# Patient Record
Sex: FEMALE | Race: WHITE | NOT HISPANIC OR LATINO
[De-identification: names, ages, dates, MRNs, and addresses within clinical notes are randomized per-mention and may not be internally consistent; named-entity substitution may affect disease eponyms.]

---

## 2018-04-10 ENCOUNTER — TRANSCRIPTION ENCOUNTER (OUTPATIENT)
Age: 58
End: 2018-04-10

## 2023-03-31 PROBLEM — Z00.00 ENCOUNTER FOR PREVENTIVE HEALTH EXAMINATION: Status: ACTIVE | Noted: 2023-03-31

## 2023-04-06 ENCOUNTER — APPOINTMENT (OUTPATIENT)
Dept: ORTHOPEDIC SURGERY | Facility: CLINIC | Age: 63
End: 2023-04-06
Payer: COMMERCIAL

## 2023-04-06 VITALS — HEIGHT: 65 IN | WEIGHT: 225 LBS | BODY MASS INDEX: 37.49 KG/M2

## 2023-04-06 DIAGNOSIS — Z87.828 PERSONAL HISTORY OF OTHER (HEALED) PHYSICAL INJURY AND TRAUMA: ICD-10-CM

## 2023-04-06 DIAGNOSIS — Z78.9 OTHER SPECIFIED HEALTH STATUS: ICD-10-CM

## 2023-04-06 DIAGNOSIS — Z72.3 LACK OF PHYSICAL EXERCISE: ICD-10-CM

## 2023-04-06 DIAGNOSIS — Z86.39 PERSONAL HISTORY OF OTHER ENDOCRINE, NUTRITIONAL AND METABOLIC DISEASE: ICD-10-CM

## 2023-04-06 DIAGNOSIS — Z82.61 FAMILY HISTORY OF ARTHRITIS: ICD-10-CM

## 2023-04-06 PROCEDURE — 73564 X-RAY EXAM KNEE 4 OR MORE: CPT | Mod: 50

## 2023-04-06 PROCEDURE — 99203 OFFICE O/P NEW LOW 30 MIN: CPT

## 2023-04-06 RX ORDER — MELOXICAM 15 MG/1
TABLET ORAL
Refills: 0 | Status: ACTIVE | COMMUNITY

## 2023-04-06 RX ORDER — ATORVASTATIN CALCIUM 10 MG/1
10 TABLET, FILM COATED ORAL
Refills: 0 | Status: ACTIVE | COMMUNITY

## 2023-04-06 NOTE — PROCEDURE
[Injection] : Injection [Right] : of the right [Knee Joint] : knee joint [Osteoarthritis] : Osteoarthritis [Patient] : patient [Risk] : risk [Benefits] : benefits [Alternatives] : alternatives [Bleeding] : bleeding [Infection] : infection [Allergic Reaction] : allergic reaction [Verbal Consent Obtained] : verbal consent was obtained prior to the procedure [Alcohol] : Alcohol [Ethyl Chloride Spray] : ethyl chloride spray was used as a topical anesthetic [Lateral] : lateral [22] : a 22-gauge [1% Lidocaine___(mL)] : [unfilled] mL of 1% Lidocaine [Triamcinolone 40mg/mL___(mL)] : [unfilled] ~UmL of 40mg/mL triamcinolone [Bandage Applied] : a bandage [Tolerated Well] : The patient tolerated the procedure well [None] : none [No Strenuous Activity___day(s)] : avoid strenuous activity for [unfilled] day(s) [PRN] : as needed [FreeTextEntry1] : ChloraPrep

## 2023-04-06 NOTE — PHYSICAL EXAM
[LE] : Sensory: Intact in bilateral lower extremities [Normal RLE] : Right Lower Extremity: No scars, rashes, lesions, ulcers, skin intact [Normal LLE] : Left Lower Extremity: No scars, rashes, lesions, ulcers, skin intact [Normal Touch] : sensation intact for touch [Normal] : Oriented to person, place, and time, insight and judgement were intact and the affect was normal [Slightly Antalgic] : slightly antalgic [Obese] : obese [de-identified] : RIGHT knee with left for comparison\par Mild antalgic\par There may be a small effusion.  No erythema or ecchymoses.  Skin is intact\par  Intact extensor mechanism.\par Knee range of motion is 5 to 120 degrees flexion with pain right knee. 0- 125 degrees left knee\par Pain with valgus stress medially.  Normal varus stress.  Negative anterior and posterior drawer.\par  Mild pain with Jorge Luis.\par Motor and sensation are intact distally\par  [de-identified] : \par \par X-rays taken today of bilateral knees weightbearing 4 views showed

## 2023-04-06 NOTE — HISTORY OF PRESENT ILLNESS
[de-identified] : Ms. Valdez is a 61 y/o woman who comes in for evaluation for RIGHT knee pain that started over a year ago when she was moving and developed acute pain in her knee.  She thought she may have injured it or twisted it when she was lifting and moving all of her boxes.  She saw her PCP who did x-rays which showed arthritis.  She felt like it was more of an injury.  She has not done any treatment except medication including meloxicam and sometimes she takes Advil as well.  This morning she took 2 Advil.\par Pain is aggravated by walking a lot and going downstairs.  She gets stiffness and pain sitting and is hard to start up when she gets up because of stiffness.  No prior injuries or problems before a year ago.  It throws off her gait and often she is limping.  Sleeps with a pillow between her knees sometimes and uses an over-the-counter sports spray which feels good.  Sometimes the knee feels like it is burning.  Pain can be a 5-7 out of 10.\par She has never had any injections or done physical therapy for her knee

## 2023-04-06 NOTE — ASSESSMENT
[FreeTextEntry1] : 62-year-old with right knee pain over the last 1-1/2 years with severe osteoarthritis in her knee greatest in the medial compartment consistent with the site of pain.  I talked her about the arthritis and treatment.  Her meniscus likely is extruded from the joint and degenerative.  Her knee is otherwise stable and I do not suspect any tears of the ligaments.  I would recommend for starting with physical therapy and weight loss to get pressure off the knee and to support her knee.  She can use heat and ice.  She can take an anti-inflammatory, either ibuprofen/IL or meloxicam but not both.  She could alternate with Tylenol.\par Since she has been taking anti-inflammatories for the past year we did a steroid injection today which hopefully will help.  If she does not get relief with the above treatment then we may consider hyaluronic acid injection.\par Knee arthritis often will worsen over time.  If it does not get better then ultimately knee replacement may be considered at some point in the future osteoarthritis gets worse.  Weight loss may really help get pressure off the knee and could help with her pain and the leg the need for surgery.

## 2023-08-01 ENCOUNTER — APPOINTMENT (OUTPATIENT)
Dept: ORTHOPEDIC SURGERY | Facility: CLINIC | Age: 63
End: 2023-08-01
Payer: COMMERCIAL

## 2023-08-01 PROCEDURE — 99212 OFFICE O/P EST SF 10 MIN: CPT | Mod: 95

## 2023-08-01 RX ORDER — HYALURONATE SODIUM, STABILIZED 88 MG/4 ML
88 SYRINGE (ML) INTRAARTICULAR
Qty: 1 | Refills: 0 | Status: ACTIVE | OUTPATIENT
Start: 2023-08-01

## 2023-08-01 NOTE — PHYSICAL EXAM
[Slightly Antalgic] : slightly antalgic [LE] : Sensory: Intact in bilateral lower extremities [Normal RLE] : Right Lower Extremity: No scars, rashes, lesions, ulcers, skin intact [Normal LLE] : Left Lower Extremity: No scars, rashes, lesions, ulcers, skin intact [Normal Touch] : sensation intact for touch [Obese] : obese [Normal] : Oriented to person, place, and time, insight and judgement were intact and the affect was normal [de-identified] : RIGHT knee with left for comparison Mild antalgic small effusion.  No erythema or ecchymoses.  Skin is intact  Intact extensor mechanism. Knee range of motion is 5 to 120 degrees flexion with minimal pain right knee. 0- 125 degrees left knee. Motor and sensation are intact distally  [de-identified] :  X-rays taken 4/6/23 of bilateral knees weightbearing 4 views showed severe medial compartment osteoarthritis in the right knee and more mild narrowing and degenerative changes in the left knee medial compartment.  Mild narrowing bilateral patellofemoral joints with small osteophytes. KL 4 changes medial compartment right knee and KL 2 in the left knee

## 2023-08-01 NOTE — ASSESSMENT
[FreeTextEntry1] : 63-year-old with right knee pain over the last 2 years with severe osteoarthritis in her knee greatest in the medial compartment consistent with the site of pain.  She has retained good partial relief of pain after the steroid injection, physical therapy and taking medication.  She has tried ibuprofen, naproxen and Tylenol which she now takes less after having the steroid injection and doing the therapy.  Given the ongoing pain we will order the hyaluronic acid injections to see if this gives her greater relief.  I discussed the injections and pros and cons.  She understands.  She will come in when we get it delivered and approved.

## 2023-08-01 NOTE — HISTORY OF PRESENT ILLNESS
[de-identified] : Ms. Valdez is a 62 y/o woman presents for f/u evaluation for chronic RIGHT knee pain that started over a year ago. Steroid injection was done in April which helped a lot.  She has been going to physical therapy which is helping as well.  She has had improvement in terms of walking with less pain and can get in and out of a car better and sleep with less pain.  She only needs to take the Advil a few times a week.  She ices regularly which helps.  It still little swollen.  She is encouraged but she also would like to try to get more relief.  She was hoping to try the hyaluronic acid injections.  She would like to try to put off knee replacement which she understands may ultimately be needed.

## 2024-04-23 ENCOUNTER — NON-APPOINTMENT (OUTPATIENT)
Age: 64
End: 2024-04-23

## 2024-05-03 ENCOUNTER — APPOINTMENT (OUTPATIENT)
Dept: ORTHOPEDIC SURGERY | Facility: CLINIC | Age: 64
End: 2024-05-03
Payer: COMMERCIAL

## 2024-05-03 VITALS
HEIGHT: 65 IN | HEART RATE: 74 BPM | OXYGEN SATURATION: 99 % | SYSTOLIC BLOOD PRESSURE: 150 MMHG | WEIGHT: 240 LBS | BODY MASS INDEX: 39.99 KG/M2 | DIASTOLIC BLOOD PRESSURE: 81 MMHG

## 2024-05-03 PROCEDURE — 73564 X-RAY EXAM KNEE 4 OR MORE: CPT | Mod: RT

## 2024-05-03 PROCEDURE — 99214 OFFICE O/P EST MOD 30 MIN: CPT

## 2024-05-03 PROCEDURE — 77073 BONE LENGTH STUDIES: CPT

## 2024-05-03 NOTE — CONSULT LETTER
[Dear  ___] : Dear  [unfilled], [Consult Letter:] : I had the pleasure of evaluating your patient, [unfilled]. [Please see my note below.] : Please see my note below. [Consult Closing:] : Thank you very much for allowing me to participate in the care of this patient.  If you have any questions, please do not hesitate to contact me. [Sincerely,] : Sincerely, [FreeTextEntry3] : Krishna Muro Jr. MD

## 2024-05-03 NOTE — PHYSICAL EXAM
[de-identified] : Right  Knee/Lower extremity Exam:   Skin: Normal. No erythema, no ecchymosis, no abrasions, no scratches, no tattoos.  Lower legs both have moderate edema from ankles to knee.                  Old Incisions: None.  Knee Joint Swelling: Positive effusion. Mild to Moderate  Popliteal Swelling: No palpable popliteal cysts	  Pre-Patella Bursa Swelling: None.   Alignment: 		         Varus, Mild  Passively correctable to near neutral.    Knee Joint Line Tenderness:  Tender at medial joint line.  Minimally tender at the mid lateral joint line.    Not tender in the patellofemoral compartment.   Soft Tissue Tenderness: None.   Medial Collateral Ligament Laxity:  Good endpoint.                                                        Medial opening to valgus stress.   Mild to Moderate.      Lateral Collateral Ligament Laxity:           Normal laxity. Good endpoint.   ROM Extension: 0 degrees.    ROM Flexion: 95 degrees.    ACL Testing: 			 Stable ACL. Good Endpoint.                                                                 Lachman Test: Negative  Anterior Drawer Test: Negative   PCL Testing: 			 Stable PCL.  Good Endpoint.                                                                Posterior Drawer Test: Negative   Patella Compression Test: Negative  Patellofemoral Crepitus: None.    Patellofemoral Apprehension Testing: Negative.  Patellofemoral Laxity: Normal.  Motor Strength: 			 Quadriceps strength is 5 out of 5                                                                 Hamstring strength is 5 out of 5                                                                Ankle dorsiflexion strength is 5 out of 5                                                               Ankle plantarflexion strength is 5 out of 5   Sensation: Light tough sensation in the lower extremity is grossly normal.                                       Pulses: 				 Pulses are palpable at the ankle at the Dorsalis Pedis Artery.                                                                Pulses are palpable at the Posterior Tibialis Artery.                                                                 Hip Motion: The patient has full and painless hip range of motion.                                                          Lumbar Spine Symptoms: Negative straight leg raise.                                                                Gait: Limping Gait.  Abnormal gait.    Assistive Devices: 	None   [de-identified] : X-ray of the Right Knee:  AP Standing View: Medial joint space loss. Severe.  Lateral joint space preserved.  PA Flexion View: Medial joint space loss. Severe. Bone on Bone. Lateral joint space preserved.  Lateral View:  Patellofemoral joint space preserved.  [place] View:  Patellofemoral joint space is preserved. Patellofemoral joint central tracking.  Bilateral Hip to Ankle Standing View: Alignment: 6 degrees varus on the right and 3 degrees of varus on the left. Medial joint space loss. Severe.  Hips: No significant changes

## 2024-05-03 NOTE — HISTORY OF PRESENT ILLNESS
[de-identified] : Sveta is a 63 year old female who presents today with right knee pain. She states the pain started 3 years ago after moving her home. She describes medial, back and front of knee pain. She has tried rest, ice, heat, Advil, PT and a single cortisone injection in the past to help with the pain about a year ago. She was referred by Dr. Montejo who gave her a CSI April of 2023.  [ Right Knee  ]  Onset of Knee Symptoms: 3 years  Knee Symptoms: Pain with Walking, Swelling front knee, behind knee, Limping, Decrease Walk Distance and Painful Clicking  Location of Pain: Inner Side of Knee, Back of Knee and Front of Knee  Duration of Pain: Daily  Intensity of Pain: Pain Level: 6-8 moderate  Character of Pain: Pain is Getting Worse  Painful Activities: Walking, Walking after Sitting, Rising From a Seat Stairs Going: up and down Car Getting: in and out  Knee History: History of Osteoarthritis  Non-Surgical Knee Treatments: Rest, Ice, Heat, NSAIDs, Physical Therapy and Cortisone Joint Injection  Surgical Knee History: none

## 2024-05-03 NOTE — DISCUSSION/SUMMARY
[de-identified] : Impression: Right knee osteoarthritis severe in the medial compartment. Medial compartment is severe with bone on bone on the flexion views. Joint space is preserved in the lateral and Patellofemoral compartments. Pain is mostly medial and posterior. She has some mild tenderness at the mid lateral joint line but no popping or crepitus at the lateral joint line. Pain has been going on for 3 years. She has tried PT, Cortisone, Advil, ice and has been diligently loosing weight.  Plan: Sveta would be a good candidate for a medial partial knee replacement as long as the lateral compartment has no meniscal tears or cartilage defects. We will get her an MRI to evaluate the lateral compartment for patholgy. IF normal on the lateral side, we will discuss partial knee replacement again. IF the lateral compartment has significant pathology, total knee would be more appropriate. She will return after the MRI for discussion.  cc: Francesca Simon MD

## 2024-05-13 ENCOUNTER — OUTPATIENT (OUTPATIENT)
Dept: OUTPATIENT SERVICES | Facility: HOSPITAL | Age: 64
LOS: 1 days | End: 2024-05-13

## 2024-05-13 ENCOUNTER — APPOINTMENT (OUTPATIENT)
Dept: MRI IMAGING | Facility: CLINIC | Age: 64
End: 2024-05-13
Payer: COMMERCIAL

## 2024-05-13 ENCOUNTER — APPOINTMENT (OUTPATIENT)
Dept: ORTHOPEDIC SURGERY | Facility: CLINIC | Age: 64
End: 2024-05-13
Payer: COMMERCIAL

## 2024-05-13 VITALS
DIASTOLIC BLOOD PRESSURE: 84 MMHG | OXYGEN SATURATION: 97 % | SYSTOLIC BLOOD PRESSURE: 129 MMHG | HEART RATE: 73 BPM | RESPIRATION RATE: 18 BRPM

## 2024-05-13 DIAGNOSIS — M17.11 UNILATERAL PRIMARY OSTEOARTHRITIS, RIGHT KNEE: ICD-10-CM

## 2024-05-13 PROCEDURE — 99213 OFFICE O/P EST LOW 20 MIN: CPT

## 2024-05-13 PROCEDURE — 73721 MRI JNT OF LWR EXTRE W/O DYE: CPT | Mod: 26,RT

## 2024-05-14 PROBLEM — M17.11 ARTHRITIS OF KNEE, RIGHT: Status: ACTIVE | Noted: 2023-04-06

## 2024-05-14 NOTE — HISTORY OF PRESENT ILLNESS
[de-identified] : Sveta is a 63 year old female who presents today with right knee pain and is here to discuss her MRI. We reviewed the MRI today in the exam room and talked about her knee in detail.  Her medial meniscus is severly torn and the medial compartment has severe osteoarthrtis. However, I was unable to appreciate the extent of the cartilage irregularity on the small exam room monitor and after further review on a high resolution monitor after her visit, it was apparent that the cartilage on the lateral compartment had areas of more moderate thinning and the was one or two images showing a midbody lateral meniscus tear. The patellofemoral compartment also had midl to moderate cartilage thinning in areas. So although the initial review made me think she was a good candidate for a medial partial knee, after further review, I feel she would be better served with a total knee which would address the lateral and patellofemoral findings seen on the MRI despite her not having lateral or patellofemoral symptoms today.

## 2024-05-14 NOTE — DISCUSSION/SUMMARY
[de-identified] : Sveta is a 63-year-old female who presents today with right knee pain and is here to discuss her MRI. We reviewed the MRI today in the exam room and talked about her knee in detail.  Her medial meniscus is severely torn, and the medial compartment has severe osteoarthritis. However, I was unable to appreciate the extent of the cartilage irregularity on the small exam room monitor and after further review on a high-resolution monitor after her visit, it was apparent that the cartilage on the lateral compartment had areas of more moderate thinning and the was one or two images showing a midbody lateral meniscus tear. The patellofemoral compartment also had mild to moderate cartilage thinning in areas. So, although the initial review made me think she was a good candidate for a medial partial knee, after further review, I feel she would be better served with a total knee which would address the lateral and patellofemoral findings seen on the MRI despite her not having lateral or patellofemoral symptoms today.

## 2024-05-17 ENCOUNTER — NON-APPOINTMENT (OUTPATIENT)
Age: 64
End: 2024-05-17

## 2024-06-03 ENCOUNTER — TRANSCRIPTION ENCOUNTER (OUTPATIENT)
Age: 64
End: 2024-06-03

## 2024-06-03 ENCOUNTER — NON-APPOINTMENT (OUTPATIENT)
Age: 64
End: 2024-06-03

## 2024-06-04 DIAGNOSIS — Z47.1 AFTERCARE FOLLOWING JOINT REPLACEMENT SURGERY: ICD-10-CM

## 2024-06-04 DIAGNOSIS — Z01.812 ENCOUNTER FOR PREPROCEDURAL LABORATORY EXAMINATION: ICD-10-CM

## 2024-06-04 DIAGNOSIS — Z96.651 AFTERCARE FOLLOWING JOINT REPLACEMENT SURGERY: ICD-10-CM

## 2024-06-04 DIAGNOSIS — Z96.651 PRESENCE OF RIGHT ARTIFICIAL KNEE JOINT: ICD-10-CM

## 2024-06-08 ENCOUNTER — NON-APPOINTMENT (OUTPATIENT)
Age: 64
End: 2024-06-08

## 2024-06-12 ENCOUNTER — OUTPATIENT (OUTPATIENT)
Dept: OUTPATIENT SERVICES | Facility: HOSPITAL | Age: 64
LOS: 1 days | End: 2024-06-12

## 2024-06-12 ENCOUNTER — APPOINTMENT (OUTPATIENT)
Dept: CT IMAGING | Facility: CLINIC | Age: 64
End: 2024-06-12
Payer: COMMERCIAL

## 2024-06-12 ENCOUNTER — APPOINTMENT (OUTPATIENT)
Dept: ORTHOPEDIC SURGERY | Facility: CLINIC | Age: 64
End: 2024-06-12
Payer: COMMERCIAL

## 2024-06-12 VITALS
OXYGEN SATURATION: 97 % | SYSTOLIC BLOOD PRESSURE: 134 MMHG | BODY MASS INDEX: 39.99 KG/M2 | DIASTOLIC BLOOD PRESSURE: 83 MMHG | HEIGHT: 65 IN | WEIGHT: 240 LBS | HEART RATE: 74 BPM

## 2024-06-12 DIAGNOSIS — G89.29 PAIN IN RIGHT KNEE: ICD-10-CM

## 2024-06-12 DIAGNOSIS — M25.561 PAIN IN RIGHT KNEE: ICD-10-CM

## 2024-06-12 DIAGNOSIS — S83.241D OTHER TEAR OF MEDIAL MENISCUS, CURRENT INJURY, RIGHT KNEE, SUBSEQUENT ENCOUNTER: ICD-10-CM

## 2024-06-12 DIAGNOSIS — S83.281D OTHER TEAR OF LATERAL MENISCUS, CURRENT INJURY, RIGHT KNEE, SUBSEQUENT ENCOUNTER: ICD-10-CM

## 2024-06-12 DIAGNOSIS — M17.11 UNILATERAL PRIMARY OSTEOARTHRITIS, RIGHT KNEE: ICD-10-CM

## 2024-06-12 PROCEDURE — 99215 OFFICE O/P EST HI 40 MIN: CPT

## 2024-06-12 PROCEDURE — 73700 CT LOWER EXTREMITY W/O DYE: CPT | Mod: 26,RT

## 2024-06-12 NOTE — DISCUSSION/SUMMARY
[de-identified] : Assessment:   -Knee Pain, Right Side -Osteoarthritis of the Right Knee. Lateral and medial meniscus tears.   -X-rays show advance osteoarthritis with moderate to severe joint space narrowing. -Imaging reviewed with patient today -Pain with weightbearing activity -Failure of conservative management.   Plan: -Procedure: Total Knee Replacement -Procedure date:6.18.2024 -Procedure Location: On license of [facility]. -Cleared for surgery, clearance and labs reviewed. -CT for preoperative and intraoperative Quang planning completed  -NPO midnight before surgery. - Although she is not of ideal weight and has edema in the legs, she is otherwise very motivated to do well and only is taking Lipitor for her medical issues. Once her knee is better, she is committed to getting back into shape again and I will cheer her on.   Discussion Total Knee Replacement:   - I had a discussion with the patient regarding the findings of their history, exam and imaging. We discussed the option of Total Knee Replacement using the precision Quang Robotic-Arm System and implants from [email or web address]. We discussed the indications, surgical process, my techniques of surgery, the probable post-operative course and instructions, and the risks and benefits of the procedure.   - Although there are no guarantees to success, I feel that the surgery would be beneficial and there is an excellent chance for a positive outcome.   - The implants used are the Triathlon knee system from [email or web address]. The materials are Titanium tibial implants, CoCr Alloy femoral implants, Polyethylene tibial bearings.   - The risks of this procedure include but are not limited to the risks of anesthesia, heart attack, stroke, respiratory complications, wound healing problems, skin blisters, delayed wound healing, infection, bleeding, nerve damage, vessel damage, skin sensory changes next to the incisions, loss of motion, scar tissue formation requiring further treatment, blood clots, heterotopic bone formation, implant wear, implant loosening after surgery, allergic responses to the implant materials, continued pain despite proper implant placement, soft tissue pain, possible recurrent knee swelling, and the possibility for further surgery in the future.   - The patient had their questions answered to their satisfaction. - The patient understood our discussion that was aided by the use of knee models and implants, and a review of their imaging on the computer screen. - The patient expressed understanding and agreement with the plan.   - I look forward to the opportunity to help this patient with their painful knee condition.

## 2024-06-12 NOTE — PHYSICAL EXAM
[de-identified] : Right  Knee/Lower extremity Exam:   Skin:  No erythema, no ecchymosis, no abrasions, no scratches, no tattoos.  Lower legs both have moderate edema from ankles to knee. lower legs both have a purple hue to them. Both ankles are swollen.                  Old Incisions: None.  Knee Joint Swelling: Positive effusion. Mild to Moderate  Popliteal Swelling: No palpable popliteal cysts	  Pre-Patella Bursa Swelling: None.   Alignment: 		         Varus, Mild  Passively correctable to near neutral.    Knee Joint Line Tenderness:  Tender at medial joint line.  Minimally tender at the mid lateral joint line.    Not tender in the patellofemoral compartment.   Soft Tissue Tenderness: None.   Medial Collateral Ligament Laxity:  Good endpoint.                                                        Medial opening to valgus stress.   Mild to Moderate.      Lateral Collateral Ligament Laxity:           Normal laxity. Good endpoint.   ROM Extension: 0 degrees.    ROM Flexion: 95 degrees.  Maximum is 105 at PT.  ACL Testing: 			 Stable ACL. Good Endpoint.                                                                 Lachman Test: Negative  Anterior Drawer Test: Negative   PCL Testing: 			 Stable PCL.  Good Endpoint.                                                                Posterior Drawer Test: Negative   Patella Compression Test: Negative  Patellofemoral Crepitus: None.    Patellofemoral Apprehension Testing: Negative.  Patellofemoral Laxity: Normal.  Motor Strength: 			 Quadriceps strength is 5 out of 5                                                                 Hamstring strength is 5 out of 5                                                                Ankle dorsiflexion strength is 5 out of 5                                                               Ankle plantarflexion strength is 5 out of 5   Sensation: Light tough sensation in the lower extremity is grossly normal.                                       Pulses: 				 Pulses are palpable at the ankle at the Dorsalis Pedis Artery.                                                                Pulses are palpable at the Posterior Tibialis Artery.                                                                 Hip Motion: The patient has full and painless hip range of motion.                                                          Lumbar Spine Symptoms: Negative straight leg raise.                                                                Gait: Limping Gait.  Abnormal gait.    Assistive Devices: 	None   [de-identified] : X-ray of the Right Knee:  AP Standing View: Medial joint space loss. Severe.  Lateral joint space preserved.  PA Flexion View: Medial joint space loss. Severe. Bone on Bone. Lateral joint space preserved.  Lateral View:  Patellofemoral joint space preserved.  [place] View:  Patellofemoral joint space is preserved. Patellofemoral joint central tracking.  Bilateral Hip to Ankle Standing View: Alignment: 6 degrees varus on the right and 3 degrees of varus on the left. Medial joint space loss. Severe.  Hips: No significant changes   MRI : Extensive radial tearing of the body of the medial meniscus extending to the anterior horn and with oblique component extending to the posterior horn; associated full-thickness chondral loss of the medial compartment with severe secondary arthrosis.  Radial tearing of the body of the lateral meniscus with low-grade chondral loss of the lateral compartment.

## 2024-06-13 LAB
MRSA SPEC QL CULT: NOT DETECTED
STAPH AUREUS (SA): NOT DETECTED

## 2024-06-14 RX ORDER — HYDROCODONE BITARTRATE AND ACETAMINOPHEN 7.5; 325 MG/1; MG/1
7.5-325 TABLET ORAL
Qty: 30 | Refills: 0 | Status: ACTIVE | COMMUNITY
Start: 2024-06-14 | End: 1900-01-01

## 2024-06-14 RX ORDER — CELECOXIB 200 MG/1
200 CAPSULE ORAL
Qty: 60 | Refills: 2 | Status: ACTIVE | COMMUNITY
Start: 2024-06-14 | End: 1900-01-01

## 2024-06-14 RX ORDER — CEFADROXIL 500 MG/1
500 CAPSULE ORAL TWICE DAILY
Qty: 14 | Refills: 1 | Status: ACTIVE | COMMUNITY
Start: 2024-06-14 | End: 1900-01-01

## 2024-06-18 ENCOUNTER — APPOINTMENT (OUTPATIENT)
Age: 64
End: 2024-06-18
Payer: COMMERCIAL

## 2024-06-18 PROCEDURE — 27447 TOTAL KNEE ARTHROPLASTY: CPT | Mod: RT

## 2024-06-28 ENCOUNTER — NON-APPOINTMENT (OUTPATIENT)
Age: 64
End: 2024-06-28

## 2024-07-10 ENCOUNTER — APPOINTMENT (OUTPATIENT)
Dept: ORTHOPEDIC SURGERY | Facility: CLINIC | Age: 64
End: 2024-07-10
Payer: COMMERCIAL

## 2024-07-10 ENCOUNTER — OUTPATIENT (OUTPATIENT)
Dept: OUTPATIENT SERVICES | Facility: HOSPITAL | Age: 64
LOS: 1 days | End: 2024-07-10
Payer: COMMERCIAL

## 2024-07-10 VITALS
HEIGHT: 65 IN | WEIGHT: 240 LBS | SYSTOLIC BLOOD PRESSURE: 137 MMHG | DIASTOLIC BLOOD PRESSURE: 83 MMHG | OXYGEN SATURATION: 96 % | HEART RATE: 81 BPM | BODY MASS INDEX: 39.99 KG/M2

## 2024-07-10 DIAGNOSIS — Z96.651 AFTERCARE FOLLOWING JOINT REPLACEMENT SURGERY: ICD-10-CM

## 2024-07-10 DIAGNOSIS — Z96.651 PRESENCE OF RIGHT ARTIFICIAL KNEE JOINT: ICD-10-CM

## 2024-07-10 DIAGNOSIS — M17.11 UNILATERAL PRIMARY OSTEOARTHRITIS, RIGHT KNEE: ICD-10-CM

## 2024-07-10 DIAGNOSIS — Z47.1 AFTERCARE FOLLOWING JOINT REPLACEMENT SURGERY: ICD-10-CM

## 2024-07-10 PROCEDURE — 77073 BONE LENGTH STUDIES: CPT

## 2024-07-10 PROCEDURE — 73562 X-RAY EXAM OF KNEE 3: CPT | Mod: 26,RT

## 2024-07-10 PROCEDURE — 77073 BONE LENGTH STUDIES: CPT | Mod: 26

## 2024-07-10 PROCEDURE — 99024 POSTOP FOLLOW-UP VISIT: CPT

## 2024-07-10 PROCEDURE — 73562 X-RAY EXAM OF KNEE 3: CPT

## 2024-07-23 ENCOUNTER — NON-APPOINTMENT (OUTPATIENT)
Age: 64
End: 2024-07-23

## 2024-07-31 ENCOUNTER — APPOINTMENT (OUTPATIENT)
Dept: ORTHOPEDIC SURGERY | Facility: CLINIC | Age: 64
End: 2024-07-31
Payer: COMMERCIAL

## 2024-07-31 VITALS
WEIGHT: 240 LBS | SYSTOLIC BLOOD PRESSURE: 141 MMHG | OXYGEN SATURATION: 97 % | DIASTOLIC BLOOD PRESSURE: 85 MMHG | BODY MASS INDEX: 39.99 KG/M2 | HEIGHT: 65 IN | HEART RATE: 83 BPM

## 2024-07-31 DIAGNOSIS — Z47.1 AFTERCARE FOLLOWING JOINT REPLACEMENT SURGERY: ICD-10-CM

## 2024-07-31 DIAGNOSIS — Z96.651 PRESENCE OF RIGHT ARTIFICIAL KNEE JOINT: ICD-10-CM

## 2024-07-31 DIAGNOSIS — Z96.651 AFTERCARE FOLLOWING JOINT REPLACEMENT SURGERY: ICD-10-CM

## 2024-07-31 PROCEDURE — 99024 POSTOP FOLLOW-UP VISIT: CPT

## 2024-07-31 NOTE — HISTORY OF PRESENT ILLNESS
[de-identified] : Sveta is a 64 year old female who presents today for a post op visit.   Post op Day: 6 weeks Surgery Type: Total Knee Arthroplasty Side of Surgery: Right Date of Surgery: 06.18.24   Pain Level: 1 Assistive Device: none Satisfaction Level: Very Satisfied   Activities: Walking, home exercises and outpatient PT [de-identified] : Sveta is a 64 year old female who presents today 6 weeks s/p right TKA.  [de-identified] : Incision: Healing midline incision. Skin looks great today.  Incision Pin Site: Healed tibial pin site incision.  Skin: No ecchymosis. No drainage. intact.   Knee Joint Swelling:. Mild effusion.  Alignment: Neutral.  ROM Extension: 0 degrees. ROM Flexion: 115 degrees.  Medial Collateral Ligament Laxity: Normal laxity. Good endpoint.  Lateral Collateral Ligament Laxity: Normal laxity. Good endpoint.  Instability: No flexion or extension instability  Anterior Drawer Test: No significant translation Posterior Drawer Test: Stable with good endpoint  Motor Strength:   Quadriceps strength is 5 out of 5 Hamstring strength is 5 out of 5 Ankle dorsiflexion strength is 5 out of 5 Ankle plantarflexion strength is 5 out of 5  Sensation: Light tough sensation in the lower extremity is grossly normal. Sensory change is located lateral to incision as expected.  Pulses: Pulses are palpable at the ankle at the Dorsalis Pedis Artery. Pulses are palpable at the Posterior Tibialis Artery.  Gait: Gait improving and expected after surgery.  Assistive Devices:  cane.  [de-identified] : none [de-identified] : Assessment:  - Post Op 6 Weeks after Total Knee Replacement, Quang Robotic-Assisted. - Cementless all three components. - Incision clean and dry with no drainage.  - Incision Healing Well. - Skin Edges healthy. No signs of infection. - No calf tenderness. No signs of DVT. - Pain well controlled. Not taking narcotics. - Gait is improving. - Has been going to Physical Therapy. - Range of Motion continues to improve. - Stable collateral ligament exam. No Flexion instability. -  Patient is very satisfied with her results so far.   Plan:  - Continue to work on stretching and strengthening of operative leg with PT and daily home exercise program. - May continue to apply scar reduction therapies to incisions. - Continue use of cold therapy wrap after exercise and long walks daily, as needed. - Follow-up 3 months after date of surgery with X-rays. Bilateral Standing AP, Lateral, [place] Views.

## 2024-07-31 NOTE — HISTORY OF PRESENT ILLNESS
[de-identified] : Sveta is a 64 year old female who presents today for a post op visit.   Post op Day: 6 weeks Surgery Type: Total Knee Arthroplasty Side of Surgery: Right Date of Surgery: 06.18.24   Pain Level: 1 Assistive Device: none Satisfaction Level: Very Satisfied   Activities: Walking, home exercises and outpatient PT [de-identified] : Sveta is a 64 year old female who presents today 6 weeks s/p right TKA.  [de-identified] : Incision: Healing midline incision. Skin looks great today.  Incision Pin Site: Healed tibial pin site incision.  Skin: No ecchymosis. No drainage. intact.   Knee Joint Swelling:. Mild effusion.  Alignment: Neutral.  ROM Extension: 0 degrees. ROM Flexion: 115 degrees.  Medial Collateral Ligament Laxity: Normal laxity. Good endpoint.  Lateral Collateral Ligament Laxity: Normal laxity. Good endpoint.  Instability: No flexion or extension instability  Anterior Drawer Test: No significant translation Posterior Drawer Test: Stable with good endpoint  Motor Strength:   Quadriceps strength is 5 out of 5 Hamstring strength is 5 out of 5 Ankle dorsiflexion strength is 5 out of 5 Ankle plantarflexion strength is 5 out of 5  Sensation: Light tough sensation in the lower extremity is grossly normal. Sensory change is located lateral to incision as expected.  Pulses: Pulses are palpable at the ankle at the Dorsalis Pedis Artery. Pulses are palpable at the Posterior Tibialis Artery.  Gait: Gait improving and expected after surgery.  Assistive Devices:  cane.  [de-identified] : none [de-identified] : Assessment:  - Post Op 6 Weeks after Total Knee Replacement, Quang Robotic-Assisted. - Cementless all three components. - Incision clean and dry with no drainage.  - Incision Healing Well. - Skin Edges healthy. No signs of infection. - No calf tenderness. No signs of DVT. - Pain well controlled. Not taking narcotics. - Gait is improving. - Has been going to Physical Therapy. - Range of Motion continues to improve. - Stable collateral ligament exam. No Flexion instability. -  Patient is very satisfied with her results so far.   Plan:  - Continue to work on stretching and strengthening of operative leg with PT and daily home exercise program. - May continue to apply scar reduction therapies to incisions. - Continue use of cold therapy wrap after exercise and long walks daily, as needed. - Follow-up 3 months after date of surgery with X-rays. Bilateral Standing AP, Lateral, [place] Views.

## 2024-09-11 ENCOUNTER — RX RENEWAL (OUTPATIENT)
Age: 64
End: 2024-09-11

## 2024-09-18 ENCOUNTER — OUTPATIENT (OUTPATIENT)
Dept: OUTPATIENT SERVICES | Facility: HOSPITAL | Age: 64
LOS: 1 days | End: 2024-09-18
Payer: COMMERCIAL

## 2024-09-18 ENCOUNTER — APPOINTMENT (OUTPATIENT)
Dept: ORTHOPEDIC SURGERY | Facility: CLINIC | Age: 64
End: 2024-09-18
Payer: COMMERCIAL

## 2024-09-18 VITALS
HEART RATE: 76 BPM | HEIGHT: 65 IN | SYSTOLIC BLOOD PRESSURE: 137 MMHG | BODY MASS INDEX: 39.15 KG/M2 | RESPIRATION RATE: 18 BRPM | DIASTOLIC BLOOD PRESSURE: 90 MMHG | OXYGEN SATURATION: 97 % | WEIGHT: 235 LBS

## 2024-09-18 DIAGNOSIS — M17.11 UNILATERAL PRIMARY OSTEOARTHRITIS, RIGHT KNEE: ICD-10-CM

## 2024-09-18 DIAGNOSIS — Z79.2 LONG TERM (CURRENT) USE OF ANTIBIOTICS: ICD-10-CM

## 2024-09-18 DIAGNOSIS — Z47.1 AFTERCARE FOLLOWING JOINT REPLACEMENT SURGERY: ICD-10-CM

## 2024-09-18 DIAGNOSIS — Z96.651 AFTERCARE FOLLOWING JOINT REPLACEMENT SURGERY: ICD-10-CM

## 2024-09-18 PROCEDURE — 99213 OFFICE O/P EST LOW 20 MIN: CPT

## 2024-09-18 PROCEDURE — 77073 BONE LENGTH STUDIES: CPT

## 2024-09-18 PROCEDURE — 77073 BONE LENGTH STUDIES: CPT | Mod: 26

## 2024-09-18 PROCEDURE — 73562 X-RAY EXAM OF KNEE 3: CPT

## 2024-09-18 PROCEDURE — 73562 X-RAY EXAM OF KNEE 3: CPT | Mod: 26,RT

## 2024-09-18 NOTE — PHYSICAL EXAM
[de-identified] : Physical Exam: Incision: Healed midline incision. Skin looks great today.  Incision Pin Site: Healed tibial pin site incision.  Skin: No ecchymosis. No drainage. intact.  Knee Joint Swelling:. No effusion.  Alignment: Neutral.  ROM Extension: 0 degrees. ROM Flexion: 120 degrees.  Medial Collateral Ligament Laxity: Normal laxity. Good endpoint. Lateral Collateral Ligament Laxity: Normal laxity. Good endpoint.  Instability: No flexion or extension instability  Anterior Drawer Test: No significant translation Posterior Drawer Test: Stable with good endpoint  Motor Strength: Quadriceps strength is 5 out of 5 Hamstring strength is 5 out of 5 Ankle dorsiflexion strength is 5 out of 5 Ankle plantarflexion strength is 5 out of 5  Sensation: Light tough sensation in the lower extremity is grossly normal. Sensory change is located lateral to incision as expected.  Pulses: Pulses are palpable at the ankle at the Dorsalis Pedis Artery. Pulses are palpable at the Posterior Tibialis Artery.  Gait: Gait improving and expected after surgery. going down stairs now better. riding bike.  Assistive Devices: None.      [de-identified] : Examination of the Knee: Right Left   Views: AP, Lateral, Merchant, Hip to Ankle   Knee replacement is a Rony Triathlon Total Knee. Knee Implants are all cementless.     AP Standing View: Total Knee Replacement in good position. No signs of Loosening. No subsidence. No fracture.   Lateral View: Total Knee Replacement in good position. Fit is proper on femur and tibia.   Patella has a metal backed asymmetric button patella with 3 posts in good position.   [place] View: Patellofemoral joint shows implants are centrally tracking.   Bilateral Hip to Ankle Standing View: Knee Alignment is in good overall position. Alignment is    4  degrees Varus on the Right. Alignment is    3  degress Varus  on the Left.

## 2024-09-18 NOTE — DISCUSSION/SUMMARY
[de-identified] : Assessment:  - Post Op 3 Months after Total Knee Replacement, [facility] Robotic-Assisted. - Lost 5 more pounds - Very happy with knee progress and incision appearance. - Cementless all three components. - Incision clean and dry with no drainage.  - Incisions well Healed. - No signs of infection. - No calf tenderness. No signs of DVT. - Pain well controlled. Not taking narcotics. - Gait is very good. - Has been going to Physical Therapy 3 times per week. - Range of Motion continues to improve. 0-120 - Stable collateral ligament exam. No Flexion instability. - Patient is very satisfied with their progress and early outcome today and will continue to work on improving strength and flexibility.   Plan:  - Continue to work on stretching and strengthening of surgical leg. - Continue with a life long daily home exercise or fitness center program. - Follow-up 6 months after date of surgery with X-rays. Bilateral Standing AP, Lateral, Merchant Views.

## 2024-09-18 NOTE — REASON FOR VISIT
[Follow-Up Visit] : a follow-up visit for [FreeTextEntry2] : 3 months s/p right TKA on 6.18.24 at [facility]

## 2024-09-18 NOTE — HISTORY OF PRESENT ILLNESS
[de-identified] : Sveta is a 64 year old female who presents today 3 months s/p right TKA on 6.18.24 at [facility]> She states she is feeling...  Post op Day: 3 months Surgery Type: Total Knee Arthroplasty Side of Surgery: Right Date of Surgery: 6.18.24   Pain Level: 1 Assistive Device: None Satisfaction Level: Very Satisfied Activities: Walking, Physical therapy

## 2024-09-18 NOTE — PHYSICAL EXAM
[de-identified] : Physical Exam: Incision: Healed midline incision. Skin looks great today.  Incision Pin Site: Healed tibial pin site incision.  Skin: No ecchymosis. No drainage. intact.  Knee Joint Swelling:. No effusion.  Alignment: Neutral.  ROM Extension: 0 degrees. ROM Flexion: 120 degrees.  Medial Collateral Ligament Laxity: Normal laxity. Good endpoint. Lateral Collateral Ligament Laxity: Normal laxity. Good endpoint.  Instability: No flexion or extension instability  Anterior Drawer Test: No significant translation Posterior Drawer Test: Stable with good endpoint  Motor Strength: Quadriceps strength is 5 out of 5 Hamstring strength is 5 out of 5 Ankle dorsiflexion strength is 5 out of 5 Ankle plantarflexion strength is 5 out of 5  Sensation: Light tough sensation in the lower extremity is grossly normal. Sensory change is located lateral to incision as expected.  Pulses: Pulses are palpable at the ankle at the Dorsalis Pedis Artery. Pulses are palpable at the Posterior Tibialis Artery.  Gait: Gait improving and expected after surgery. going down stairs now better. riding bike.  Assistive Devices: None.      [de-identified] : Examination of the Knee: Right Left   Views: AP, Lateral, Merchant, Hip to Ankle   Knee replacement is a Rony Triathlon Total Knee. Knee Implants are all cementless.     AP Standing View: Total Knee Replacement in good position. No signs of Loosening. No subsidence. No fracture.   Lateral View: Total Knee Replacement in good position. Fit is proper on femur and tibia.   Patella has a metal backed asymmetric button patella with 3 posts in good position.   [place] View: Patellofemoral joint shows implants are centrally tracking.   Bilateral Hip to Ankle Standing View: Knee Alignment is in good overall position. Alignment is    4  degrees Varus on the Right. Alignment is    3  degress Varus  on the Left.

## 2024-09-18 NOTE — DISCUSSION/SUMMARY
[de-identified] : Assessment:  - Post Op 3 Months after Total Knee Replacement, [facility] Robotic-Assisted. - Lost 5 more pounds - Very happy with knee progress and incision appearance. - Cementless all three components. - Incision clean and dry with no drainage.  - Incisions well Healed. - No signs of infection. - No calf tenderness. No signs of DVT. - Pain well controlled. Not taking narcotics. - Gait is very good. - Has been going to Physical Therapy 3 times per week. - Range of Motion continues to improve. 0-120 - Stable collateral ligament exam. No Flexion instability. - Patient is very satisfied with their progress and early outcome today and will continue to work on improving strength and flexibility.   Plan:  - Continue to work on stretching and strengthening of surgical leg. - Continue with a life long daily home exercise or fitness center program. - Follow-up 6 months after date of surgery with X-rays. Bilateral Standing AP, Lateral, Merchant Views.

## 2024-09-18 NOTE — HISTORY OF PRESENT ILLNESS
[de-identified] : Sevta is a 64 year old female who presents today 3 months s/p right TKA on 6.18.24 at [facility]> She states she is feeling...  Post op Day: 3 months Surgery Type: Total Knee Arthroplasty Side of Surgery: Right Date of Surgery: 6.18.24   Pain Level: 1 Assistive Device: None Satisfaction Level: Very Satisfied Activities: Walking, Physical therapy

## 2024-09-26 PROBLEM — Z79.2 PROPHYLACTIC ANTIBIOTIC: Status: ACTIVE | Noted: 2024-09-26

## 2024-09-26 RX ORDER — AMOXICILLIN 500 MG/1
500 CAPSULE ORAL
Qty: 20 | Refills: 2 | Status: ACTIVE | COMMUNITY
Start: 2024-09-26 | End: 1900-01-01

## 2024-11-15 ENCOUNTER — NON-APPOINTMENT (OUTPATIENT)
Age: 64
End: 2024-11-15

## 2024-12-16 ENCOUNTER — RX RENEWAL (OUTPATIENT)
Age: 64
End: 2024-12-16

## 2025-01-08 ENCOUNTER — APPOINTMENT (OUTPATIENT)
Dept: RADIOLOGY | Facility: CLINIC | Age: 65
End: 2025-01-08

## 2025-01-08 ENCOUNTER — OUTPATIENT (OUTPATIENT)
Dept: OUTPATIENT SERVICES | Facility: HOSPITAL | Age: 65
LOS: 1 days | End: 2025-01-08
Payer: COMMERCIAL

## 2025-01-08 ENCOUNTER — APPOINTMENT (OUTPATIENT)
Dept: ORTHOPEDIC SURGERY | Facility: CLINIC | Age: 65
End: 2025-01-08
Payer: COMMERCIAL

## 2025-01-08 VITALS
WEIGHT: 235 LBS | SYSTOLIC BLOOD PRESSURE: 162 MMHG | OXYGEN SATURATION: 95 % | HEART RATE: 75 BPM | BODY MASS INDEX: 39.15 KG/M2 | HEIGHT: 65 IN | DIASTOLIC BLOOD PRESSURE: 99 MMHG

## 2025-01-08 DIAGNOSIS — Z96.651 AFTERCARE FOLLOWING JOINT REPLACEMENT SURGERY: ICD-10-CM

## 2025-01-08 DIAGNOSIS — Z47.1 AFTERCARE FOLLOWING JOINT REPLACEMENT SURGERY: ICD-10-CM

## 2025-01-08 DIAGNOSIS — M17.11 UNILATERAL PRIMARY OSTEOARTHRITIS, RIGHT KNEE: ICD-10-CM

## 2025-01-08 DIAGNOSIS — Z96.651 PRESENCE OF RIGHT ARTIFICIAL KNEE JOINT: ICD-10-CM

## 2025-01-08 PROCEDURE — 99214 OFFICE O/P EST MOD 30 MIN: CPT

## 2025-01-08 PROCEDURE — 77073 BONE LENGTH STUDIES: CPT

## 2025-01-08 PROCEDURE — 73562 X-RAY EXAM OF KNEE 3: CPT | Mod: 26,RT

## 2025-01-08 PROCEDURE — 77073 BONE LENGTH STUDIES: CPT | Mod: 26

## 2025-01-08 PROCEDURE — 73562 X-RAY EXAM OF KNEE 3: CPT

## 2025-01-08 NOTE — PHYSICAL EXAM
[de-identified] : Physical Exam: Incision: Healed midline incision. Skin looks great today. Incision Pin Site: Healed tibial pin site incision. Skin: No ecchymosis. Knee Joint Swelling:. No effusion.  Alignment: Neutral.  ROM Extension: 0 degrees. ROM Flexion: 115 degrees.  Medial Collateral Ligament Laxity: Normal laxity. Good endpoint. Lateral Collateral Ligament Laxity: Normal laxity. Good endpoint.  Instability: No flexion or extension instability  Anterior Drawer Test: No significant translation Posterior Drawer Test: Stable with good endpoint  Motor Strength: Quadriceps strength is 5 out of 5 Hamstring strength is 5 out of 5 Ankle dorsiflexion strength is 5 out of 5 Ankle plantarflexion strength is 5 out of 5  Sensation: Light tough sensation in the lower extremity is grossly normal. Sensory change is located lateral to incision as expected.  Pulses: Pulses are palpable at the ankle at the Dorsalis Pedis Artery. Pulses are palpable at the Posterior Tibialis Artery.  Gait: Gait is now normal  Assistive Devices: None.      [de-identified] : Examination of the Knee: Right Left   Views: AP, Lateral, Merchant, Hip to Ankle   Knee replacement is a Leila Triathlon Total Knee. Knee Implants are all cementless.     AP Standing View: Total Knee Replacement in good position. No signs of Loosening. No subsidence. No fracture.   Lateral View: Total Knee Replacement in good position. Fit is proper on femur and tibia.   Patella has a metal backed asymmetric button patella with 3 posts in good position.   [place] View: Patellofemoral joint shows implants are centrally tracking.   Bilateral Hip to Ankle Standing View: Knee Alignment is in good overall position. Alignment is 4 degrees Varus on the Right. Alignment is 3 degrees Varus on the Left.

## 2025-01-08 NOTE — PHYSICAL EXAM
[de-identified] : Physical Exam: Incision: Healed midline incision. Skin looks great today. Incision Pin Site: Healed tibial pin site incision. Skin: No ecchymosis. Knee Joint Swelling:. No effusion.  Alignment: Neutral.  ROM Extension: 0 degrees. ROM Flexion: 115 degrees.  Medial Collateral Ligament Laxity: Normal laxity. Good endpoint. Lateral Collateral Ligament Laxity: Normal laxity. Good endpoint.  Instability: No flexion or extension instability  Anterior Drawer Test: No significant translation Posterior Drawer Test: Stable with good endpoint  Motor Strength: Quadriceps strength is 5 out of 5 Hamstring strength is 5 out of 5 Ankle dorsiflexion strength is 5 out of 5 Ankle plantarflexion strength is 5 out of 5  Sensation: Light tough sensation in the lower extremity is grossly normal. Sensory change is located lateral to incision as expected.  Pulses: Pulses are palpable at the ankle at the Dorsalis Pedis Artery. Pulses are palpable at the Posterior Tibialis Artery.  Gait: Gait is now normal  Assistive Devices: None.      [de-identified] : Examination of the Knee: Right Left   Views: AP, Lateral, Merchant, Hip to Ankle   Knee replacement is a Leila Triathlon Total Knee. Knee Implants are all cementless.     AP Standing View: Total Knee Replacement in good position. No signs of Loosening. No subsidence. No fracture.   Lateral View: Total Knee Replacement in good position. Fit is proper on femur and tibia.   Patella has a metal backed asymmetric button patella with 3 posts in good position.   [place] View: Patellofemoral joint shows implants are centrally tracking.   Bilateral Hip to Ankle Standing View: Knee Alignment is in good overall position. Alignment is 4 degrees Varus on the Right. Alignment is 3 degrees Varus on the Left.

## 2025-01-08 NOTE — DISCUSSION/SUMMARY
[de-identified] : Assessment:  - Post Op 6 Months after Total Knee Replacement, [facility] Robotic-Assisted. - Cementless all three components. - Incisions well Healed. - No signs of infection. - No calf tenderness. No signs of DVT. - Gait is very good. - Has been exercising regularly. - Range of Motion continues to improve. - Stable collateral ligament exam. No Flexion instability. - Patient is very satisfied with their progress  Plan:  - Continue to work on strengthening of surgical leg. - Continue with a life long daily home exercise or fitness center program. - Follow-up 1 year from date of surgery with X-rays. Bilateral Standing AP, Lateral, Merchant Views.

## 2025-01-08 NOTE — HISTORY OF PRESENT ILLNESS
[de-identified] : Amaya is a 64-year-old female who presents today 6 months s/p right TKA on 6.18.24 at . She states she is feeling very good. She is very happy with her result and that she no longer has pain when she is walking like she did before the surgery.  She has good motion now at 0-115.  Post op Day: 6 months Surgery Type: Total Knee Arthroplasty Side of Surgery: Right Date of Surgery: 6.18.24  Pain Level:  Assistive Device:  Satisfaction Level: Very Satisfied Activities:

## 2025-01-08 NOTE — DISCUSSION/SUMMARY
[de-identified] : Assessment:  - Post Op 6 Months after Total Knee Replacement, [facility] Robotic-Assisted. - Cementless all three components. - Incisions well Healed. - No signs of infection. - No calf tenderness. No signs of DVT. - Gait is very good. - Has been exercising regularly. - Range of Motion continues to improve. - Stable collateral ligament exam. No Flexion instability. - Patient is very satisfied with their progress  Plan:  - Continue to work on strengthening of surgical leg. - Continue with a life long daily home exercise or fitness center program. - Follow-up 1 year from date of surgery with X-rays. Bilateral Standing AP, Lateral, Merchant Views.

## 2025-01-08 NOTE — HISTORY OF PRESENT ILLNESS
[de-identified] : Amaya is a 64-year-old female who presents today 6 months s/p right TKA on 6.18.24 at . She states she is feeling very good. She is very happy with her result and that she no longer has pain when she is walking like she did before the surgery.  She has good motion now at 0-115.  Post op Day: 6 months Surgery Type: Total Knee Arthroplasty Side of Surgery: Right Date of Surgery: 6.18.24  Pain Level:  Assistive Device:  Satisfaction Level: Very Satisfied Activities:

## 2025-03-10 ENCOUNTER — RX RENEWAL (OUTPATIENT)
Age: 65
End: 2025-03-10

## 2025-04-14 ENCOUNTER — NON-APPOINTMENT (OUTPATIENT)
Age: 65
End: 2025-04-14

## 2025-06-10 ENCOUNTER — RX RENEWAL (OUTPATIENT)
Age: 65
End: 2025-06-10

## 2025-07-02 ENCOUNTER — NON-APPOINTMENT (OUTPATIENT)
Age: 65
End: 2025-07-02

## 2025-07-21 ENCOUNTER — NON-APPOINTMENT (OUTPATIENT)
Age: 65
End: 2025-07-21

## 2025-07-23 ENCOUNTER — OUTPATIENT (OUTPATIENT)
Dept: OUTPATIENT SERVICES | Facility: HOSPITAL | Age: 65
LOS: 1 days | End: 2025-07-23
Payer: COMMERCIAL

## 2025-07-23 ENCOUNTER — APPOINTMENT (OUTPATIENT)
Dept: ORTHOPEDIC SURGERY | Facility: CLINIC | Age: 65
End: 2025-07-23
Payer: COMMERCIAL

## 2025-07-23 ENCOUNTER — APPOINTMENT (OUTPATIENT)
Dept: RADIOLOGY | Facility: CLINIC | Age: 65
End: 2025-07-23

## 2025-07-23 VITALS
HEIGHT: 65 IN | WEIGHT: 235 LBS | DIASTOLIC BLOOD PRESSURE: 93 MMHG | HEART RATE: 76 BPM | BODY MASS INDEX: 39.15 KG/M2 | SYSTOLIC BLOOD PRESSURE: 139 MMHG | OXYGEN SATURATION: 96 %

## 2025-07-23 DIAGNOSIS — Z96.651 AFTERCARE FOLLOWING JOINT REPLACEMENT SURGERY: ICD-10-CM

## 2025-07-23 DIAGNOSIS — Z47.1 AFTERCARE FOLLOWING JOINT REPLACEMENT SURGERY: ICD-10-CM

## 2025-07-23 DIAGNOSIS — Z96.651 PRESENCE OF RIGHT ARTIFICIAL KNEE JOINT: ICD-10-CM

## 2025-07-23 DIAGNOSIS — M25.562 PAIN IN LEFT KNEE: ICD-10-CM

## 2025-07-23 PROCEDURE — 77073 BONE LENGTH STUDIES: CPT

## 2025-07-23 PROCEDURE — 99213 OFFICE O/P EST LOW 20 MIN: CPT

## 2025-07-23 PROCEDURE — 77073 BONE LENGTH STUDIES: CPT | Mod: 26

## 2025-07-23 PROCEDURE — 73564 X-RAY EXAM KNEE 4 OR MORE: CPT

## 2025-07-23 PROCEDURE — 73564 X-RAY EXAM KNEE 4 OR MORE: CPT | Mod: 26,50,59

## 2025-07-23 NOTE — DISCUSSION/SUMMARY
[de-identified] : Impression:  1 year status post right total knee replacement doing very well with no complaints and excellent patient satisfaction   Left medial joint line pain with the sensation of a flap clicking around in her knee for over a month now which may represent a medial meniscus tear along with an exam consistent with pes anserine bursitis of the left medial proximal tibial metaphyseal region.  Plan:  We will order an MRI of her left knee to rule out a medial meniscus tear and evaluate the Pez bursal region I will see her back once this is complete.

## 2025-07-23 NOTE — HISTORY OF PRESENT ILLNESS
[de-identified] : Amaya is a 64-year-old female who presents today 1 year s/p right TKA on 6.18.24 at . Her knee is doing great with no complaints on the right side.  However for over a month now she has been having left knee medial joint line pain and tenderness with a clicking sensation that she says feels like there is a flap in her knee.  She was also having pain over the pes bursa region of the left knee that is swollen and tender.  Post op Day: 1 year Surgery Type: Total Knee Arthroplasty Side of Surgery: Right Date of Surgery: 6.18.24  Pain Level: Assistive Device: Satisfaction Level: Very Satisfied Activities:

## 2025-07-23 NOTE — PHYSICAL EXAM
[de-identified] : Right knee exam shows a well-healed midline incision that is stable to varus and valgus stress testing in full extension and flexes to 110 degrees with no flexion instability no pain or tenderness on the joint line.  She has good pulses and good sensation in her foot and lower leg she has no problems with her right leg.    The left knee exam shows a tender medial joint line with pain along the meniscus to palpation.  She has a tender pes anserine bursa that is swollen and tender.  She has no lateral joint line tenderness no patellofemoral pain during range of motion she can extend her knee fully against resistance she can flex to 95 degrees before becomes more painful on the medial side.  She has palpable pulses in the foot and ankle she can dorsi and plantarflex her left ankle against resistance.  She does have mild edema in both lower legs that is chronic. [de-identified] : X-rays of the right knee show a well-fixed stable right total knee replacement with no signs of loosening.  The left knee shows mild to moderate medial compartment joint space narrowing and osteoarthritis.

## 2025-07-23 NOTE — HISTORY OF PRESENT ILLNESS
[de-identified] : Amaya is a 64-year-old female who presents today 1 year s/p right TKA on 6.18.24 at . Her knee is doing great with no complaints on the right side.  However for over a month now she has been having left knee medial joint line pain and tenderness with a clicking sensation that she says feels like there is a flap in her knee.  She was also having pain over the pes bursa region of the left knee that is swollen and tender.  Post op Day: 1 year Surgery Type: Total Knee Arthroplasty Side of Surgery: Right Date of Surgery: 6.18.24  Pain Level: Assistive Device: Satisfaction Level: Very Satisfied Activities:

## 2025-07-23 NOTE — PHYSICAL EXAM
[de-identified] : Right knee exam shows a well-healed midline incision that is stable to varus and valgus stress testing in full extension and flexes to 110 degrees with no flexion instability no pain or tenderness on the joint line.  She has good pulses and good sensation in her foot and lower leg she has no problems with her right leg.    The left knee exam shows a tender medial joint line with pain along the meniscus to palpation.  She has a tender pes anserine bursa that is swollen and tender.  She has no lateral joint line tenderness no patellofemoral pain during range of motion she can extend her knee fully against resistance she can flex to 95 degrees before becomes more painful on the medial side.  She has palpable pulses in the foot and ankle she can dorsi and plantarflex her left ankle against resistance.  She does have mild edema in both lower legs that is chronic. [de-identified] : X-rays of the right knee show a well-fixed stable right total knee replacement with no signs of loosening.  The left knee shows mild to moderate medial compartment joint space narrowing and osteoarthritis.

## 2025-07-23 NOTE — DISCUSSION/SUMMARY
[de-identified] : Impression:  1 year status post right total knee replacement doing very well with no complaints and excellent patient satisfaction   Left medial joint line pain with the sensation of a flap clicking around in her knee for over a month now which may represent a medial meniscus tear along with an exam consistent with pes anserine bursitis of the left medial proximal tibial metaphyseal region.  Plan:  We will order an MRI of her left knee to rule out a medial meniscus tear and evaluate the Pez bursal region I will see her back once this is complete.

## 2025-07-30 ENCOUNTER — APPOINTMENT (OUTPATIENT)
Dept: ORTHOPEDIC SURGERY | Facility: CLINIC | Age: 65
End: 2025-07-30
Payer: COMMERCIAL

## 2025-07-30 ENCOUNTER — OUTPATIENT (OUTPATIENT)
Dept: OUTPATIENT SERVICES | Facility: HOSPITAL | Age: 65
LOS: 1 days | End: 2025-07-30
Payer: COMMERCIAL

## 2025-07-30 ENCOUNTER — APPOINTMENT (OUTPATIENT)
Dept: MRI IMAGING | Facility: CLINIC | Age: 65
End: 2025-07-30

## 2025-07-30 VITALS
HEART RATE: 70 BPM | HEIGHT: 65 IN | SYSTOLIC BLOOD PRESSURE: 146 MMHG | OXYGEN SATURATION: 95 % | WEIGHT: 235 LBS | DIASTOLIC BLOOD PRESSURE: 86 MMHG | BODY MASS INDEX: 39.15 KG/M2

## 2025-07-30 VITALS — HEIGHT: 65 IN | WEIGHT: 235 LBS | BODY MASS INDEX: 39.15 KG/M2 | RESPIRATION RATE: 18 BRPM

## 2025-07-30 DIAGNOSIS — M23.612 OTHER SPONTANEOUS DISRUPTION OF ANTERIOR CRUCIATE LIGAMENT OF LEFT KNEE: ICD-10-CM

## 2025-07-30 DIAGNOSIS — M70.52 OTHER BURSITIS OF KNEE, LEFT KNEE: ICD-10-CM

## 2025-07-30 PROCEDURE — 99024 POSTOP FOLLOW-UP VISIT: CPT

## 2025-07-30 PROCEDURE — 73721 MRI JNT OF LWR EXTRE W/O DYE: CPT | Mod: 26,LT

## 2025-07-30 PROCEDURE — 20610 DRAIN/INJ JOINT/BURSA W/O US: CPT | Mod: LT

## 2025-07-30 PROCEDURE — 99204 OFFICE O/P NEW MOD 45 MIN: CPT | Mod: 25

## 2025-07-30 PROCEDURE — 73721 MRI JNT OF LWR EXTRE W/O DYE: CPT

## 2025-08-28 ENCOUNTER — RX RENEWAL (OUTPATIENT)
Age: 65
End: 2025-08-28